# Patient Record
Sex: FEMALE | Race: ASIAN | NOT HISPANIC OR LATINO
[De-identification: names, ages, dates, MRNs, and addresses within clinical notes are randomized per-mention and may not be internally consistent; named-entity substitution may affect disease eponyms.]

---

## 2018-02-22 ENCOUNTER — RECORDS - HEALTHEAST (OUTPATIENT)
Dept: ADMINISTRATIVE | Facility: OTHER | Age: 83
End: 2018-02-22

## 2018-02-22 ENCOUNTER — AMBULATORY - HEALTHEAST (OUTPATIENT)
Dept: CARDIOLOGY | Facility: CLINIC | Age: 83
End: 2018-02-22

## 2018-02-28 ENCOUNTER — OFFICE VISIT - HEALTHEAST (OUTPATIENT)
Dept: CARDIOLOGY | Facility: CLINIC | Age: 83
End: 2018-02-28

## 2018-02-28 DIAGNOSIS — R00.2 PALPITATION: ICD-10-CM

## 2018-02-28 DIAGNOSIS — E78.2 MIXED HYPERLIPIDEMIA: ICD-10-CM

## 2018-02-28 DIAGNOSIS — R06.09 DYSPNEA ON EXERTION: ICD-10-CM

## 2018-02-28 LAB
ATRIAL RATE - MUSE: 73 BPM
DIASTOLIC BLOOD PRESSURE - MUSE: NORMAL MMHG
INTERPRETATION ECG - MUSE: NORMAL
P AXIS - MUSE: 43 DEGREES
PR INTERVAL - MUSE: 182 MS
QRS DURATION - MUSE: 86 MS
QT - MUSE: 410 MS
QTC - MUSE: 451 MS
R AXIS - MUSE: 9 DEGREES
SYSTOLIC BLOOD PRESSURE - MUSE: NORMAL MMHG
T AXIS - MUSE: 35 DEGREES
VENTRICULAR RATE- MUSE: 73 BPM

## 2018-02-28 RX ORDER — NITROGLYCERIN 0.4 MG/1
0.4 TABLET SUBLINGUAL EVERY 5 MIN PRN
Status: SHIPPED | COMMUNITY
Start: 2018-02-28

## 2018-02-28 RX ORDER — CLOPIDOGREL BISULFATE 75 MG/1
75 TABLET ORAL DAILY
Status: SHIPPED | COMMUNITY
Start: 2018-02-28

## 2018-02-28 RX ORDER — ATORVASTATIN CALCIUM 20 MG/1
20 TABLET, FILM COATED ORAL AT BEDTIME
Status: SHIPPED | COMMUNITY
Start: 2018-02-28

## 2018-02-28 RX ORDER — COLCHICINE 0.6 MG/1
0.6 TABLET ORAL DAILY
Status: SHIPPED | COMMUNITY
Start: 2018-02-28

## 2018-02-28 RX ORDER — FUROSEMIDE 40 MG
40 TABLET ORAL DAILY
Status: SHIPPED | COMMUNITY
Start: 2018-02-28

## 2018-02-28 RX ORDER — ALLOPURINOL 300 MG/1
300 TABLET ORAL DAILY
Status: SHIPPED | COMMUNITY
Start: 2018-02-28

## 2018-02-28 RX ORDER — METOPROLOL SUCCINATE 50 MG/1
50 TABLET, EXTENDED RELEASE ORAL DAILY
Qty: 90 TABLET | Refills: 3 | Status: SHIPPED | OUTPATIENT
Start: 2018-02-28

## 2018-02-28 ASSESSMENT — MIFFLIN-ST. JEOR: SCORE: 936.2

## 2018-03-09 ENCOUNTER — HOSPITAL ENCOUNTER (OUTPATIENT)
Dept: NUCLEAR MEDICINE | Facility: HOSPITAL | Age: 83
Discharge: HOME | End: 2018-03-09
Attending: INTERNAL MEDICINE

## 2018-03-09 ENCOUNTER — HOSPITAL ENCOUNTER (OUTPATIENT)
Dept: CARDIOLOGY | Facility: HOSPITAL | Age: 83
Discharge: HOME | End: 2018-03-09
Attending: INTERNAL MEDICINE

## 2018-03-09 DIAGNOSIS — R06.09 DYSPNEA ON EXERTION: ICD-10-CM

## 2018-03-09 DIAGNOSIS — R06.09 OTHER FORMS OF DYSPNEA: ICD-10-CM

## 2018-03-09 LAB
AORTIC ROOT: 2.9 CM
AORTIC VALVE MEAN VELOCITY: 71.4 CM/S
AR DECEL SLOPE: 3360 MM/S2
AR PEAK VELOCITY: 431 CM/S
AV DIMENSIONLESS INDEX VTI: 0.8
AV MEAN GRADIENT: 2 MMHG
AV PEAK GRADIENT: 4.3 MMHG
AV REGURGITANT PEAK GRADIENT: 74.3 MMHG
AV REGURGITATION PRESSURE HALF TIME: 375 MS
AV VALVE AREA: 2.2 CM2
BSA FOR ECHO PROCEDURE: 1.58 M2
CV BLOOD PRESSURE: NORMAL MMHG
CV ECHO HEIGHT: 58 IN
CV ECHO WEIGHT: 134 LBS
CV STRESS CURRENT BP HE: NORMAL
CV STRESS CURRENT HR HE: 100
CV STRESS CURRENT HR HE: 101
CV STRESS CURRENT HR HE: 83
CV STRESS CURRENT HR HE: 86
CV STRESS CURRENT HR HE: 95
CV STRESS CURRENT HR HE: 96
CV STRESS CURRENT HR HE: 97
CV STRESS CURRENT HR HE: 97
CV STRESS CURRENT HR HE: 98
CV STRESS DEVIATION TIME HE: NORMAL
CV STRESS ECHO PERCENT HR HE: NORMAL
CV STRESS EXERCISE STAGE HE: NORMAL
CV STRESS FINAL RESTING BP HE: NORMAL
CV STRESS FINAL RESTING HR HE: 95
CV STRESS MAX HR HE: 102
CV STRESS MAX TREADMILL GRADE HE: 0
CV STRESS MAX TREADMILL SPEED HE: 0
CV STRESS PEAK DIA BP HE: NORMAL
CV STRESS PEAK SYS BP HE: NORMAL
CV STRESS PHASE HE: NORMAL
CV STRESS PROTOCOL HE: NORMAL
CV STRESS RESTING PT POSITION HE: NORMAL
CV STRESS ST DEVIATION AMOUNT HE: NORMAL
CV STRESS ST DEVIATION ELEVATION HE: NORMAL
CV STRESS ST EVELATION AMOUNT HE: NORMAL
CV STRESS TEST TYPE HE: NORMAL
CV STRESS TOTAL STAGE TIME MIN 1 HE: NORMAL
DOP CALC AO PEAK VEL: 104 CM/S
DOP CALC AO VTI: 20.3 CM
DOP CALC LVOT AREA: 2.83 CM2
DOP CALC LVOT DIAMETER: 1.9 CM
DOP CALC LVOT STROKE VOLUME: 45.6 CM3
DOP CALCLVOT PEAK VEL VTI: 16.1 CM
ECHO EJECTION FRACTION ESTIMATED: 50 %
EJECTION FRACTION: 57 % (ref 55–75)
FRACTIONAL SHORTENING: 31.3 % (ref 28–44)
INTERVENTRICULAR SEPTUM IN END DIASTOLE: 0.9 CM (ref 0.6–0.9)
IVS/PW RATIO: 1
LA AREA 1: 27.1 CM2
LA AREA 2: 28.1 CM2
LEFT ATRIUM LENGTH: 6.3 CM
LEFT ATRIUM SIZE: 4.8 CM
LEFT ATRIUM TO AORTIC ROOT RATIO: 1.66 NO UNITS
LEFT ATRIUM VOLUME INDEX: 65 ML/M2
LEFT ATRIUM VOLUME: 102.7 ML
LEFT VENTRICLE CARDIAC INDEX: 2.1 L/MIN/M2
LEFT VENTRICLE CARDIAC OUTPUT: 3.3 L/MIN
LEFT VENTRICLE DIASTOLIC VOLUME INDEX: 117.1 CM3/M2 (ref 34–74)
LEFT VENTRICLE DIASTOLIC VOLUME: 185 CM3 (ref 46–106)
LEFT VENTRICLE HEART RATE: 73 BPM
LEFT VENTRICLE MASS INDEX: 152.7 G/M2
LEFT VENTRICLE SYSTOLIC VOLUME INDEX: 50.6 CM3/M2 (ref 11–31)
LEFT VENTRICLE SYSTOLIC VOLUME: 80 CM3 (ref 14–42)
LEFT VENTRICULAR INTERNAL DIMENSION IN DIASTOLE: 6.4 CM (ref 3.8–5.2)
LEFT VENTRICULAR INTERNAL DIMENSION IN SYSTOLE: 4.4 CM (ref 2.2–3.5)
LEFT VENTRICULAR MASS: 241.2 G
LEFT VENTRICULAR OUTFLOW TRACT MEAN GRADIENT: 1 MMHG
LEFT VENTRICULAR OUTFLOW TRACT MEAN VELOCITY: 52.6 CM/S
LEFT VENTRICULAR POSTERIOR WALL IN END DIASTOLE: 0.9 CM (ref 0.6–0.9)
LV STROKE VOLUME INDEX: 28.9 ML/M2
MITRAL REGURGITANT VELOCITY TIME INTEGRAL: 184 CM
MITRAL VALVE E/A RATIO: 1.2
MR FLOW: 99 CM3
MR MEAN GRADIENT: 93 MMHG
MR MEAN VELOCITY: 457 CM/S
MR PEAK GRADIENT: 134.1 MMHG
MR PISA EROA: 0.5 CM2
MR PISA RADIUS: 1.4 CM
MR PISA VN NYQUIST: 25.3 CM/S
MV AVERAGE E/E' RATIO: 29.5 CM/S
MV DECELERATION TIME: 137 MS
MV E'TISSUE VEL-LAT: 7.31 CM/S
MV E'TISSUE VEL-MED: 4.09 CM/S
MV LATERAL E/E' RATIO: 23
MV MEDIAL E/E' RATIO: 41.1
MV PEAK A VELOCITY: 135 CM/S
MV PEAK E VELOCITY: 168 CM/S
MV REGURGITANT VOLUME: 99 CC
NUC REST DIASTOLIC VOLUME INDEX: 2144 LBS
NUC REST SYSTOLIC VOLUME INDEX: 58 IN
NUC STRESS EJECTION FRACTION: 31 %
PISA MR PEAK VEL: 579 CM/S
STRESS ECHO BASELINE BP: NORMAL
STRESS ECHO BASELINE HR: 82
STRESS ECHO CALCULATED PERCENT HR: 75 %
STRESS ECHO LAST STRESS BP: NORMAL
STRESS ECHO LAST STRESS HR: 101
TRICUSPID REGURGITATION PEAK PRESSURE GRADIENT: 66.9 MMHG
TRICUSPID VALVE ANULAR PLANE SYSTOLIC EXCURSION: 1.7 CM
TRICUSPID VALVE PEAK REGURGITANT VELOCITY: 409 CM/S

## 2018-03-09 ASSESSMENT — MIFFLIN-ST. JEOR: SCORE: 932.57

## 2018-03-15 ENCOUNTER — COMMUNICATION - HEALTHEAST (OUTPATIENT)
Dept: CARDIOLOGY | Facility: CLINIC | Age: 83
End: 2018-03-15

## 2018-04-30 ENCOUNTER — RECORDS - HEALTHEAST (OUTPATIENT)
Dept: ADMINISTRATIVE | Facility: OTHER | Age: 83
End: 2018-04-30

## 2018-04-30 ENCOUNTER — AMBULATORY - HEALTHEAST (OUTPATIENT)
Dept: CARDIOLOGY | Facility: CLINIC | Age: 83
End: 2018-04-30

## 2018-05-03 ENCOUNTER — OFFICE VISIT - HEALTHEAST (OUTPATIENT)
Dept: CARDIOLOGY | Facility: CLINIC | Age: 83
End: 2018-05-03

## 2018-05-03 DIAGNOSIS — I34.0 SEVERE MITRAL INSUFFICIENCY: ICD-10-CM

## 2018-05-03 DIAGNOSIS — I25.5 ISCHEMIC CARDIOMYOPATHY: ICD-10-CM

## 2018-05-03 ASSESSMENT — MIFFLIN-ST. JEOR: SCORE: 901.95

## 2019-03-19 ENCOUNTER — COMMUNICATION - HEALTHEAST (OUTPATIENT)
Dept: ADMINISTRATIVE | Facility: CLINIC | Age: 84
End: 2019-03-19

## 2021-06-01 VITALS — BODY MASS INDEX: 27.08 KG/M2 | HEIGHT: 58 IN | WEIGHT: 129 LBS

## 2021-06-01 VITALS — WEIGHT: 134.8 LBS | BODY MASS INDEX: 28.29 KG/M2 | HEIGHT: 58 IN

## 2021-06-01 VITALS — WEIGHT: 134 LBS | HEIGHT: 58 IN | BODY MASS INDEX: 28.13 KG/M2

## 2021-06-16 PROBLEM — I25.5 ISCHEMIC CARDIOMYOPATHY: Status: ACTIVE | Noted: 2018-05-03

## 2021-06-16 PROBLEM — R00.2 PALPITATION: Status: ACTIVE | Noted: 2018-02-28

## 2021-06-16 PROBLEM — R06.09 DYSPNEA ON EXERTION: Status: ACTIVE | Noted: 2018-02-28

## 2021-06-16 PROBLEM — E78.2 MIXED HYPERLIPIDEMIA: Status: ACTIVE | Noted: 2018-02-28

## 2021-06-16 PROBLEM — I34.0 SEVERE MITRAL INSUFFICIENCY: Status: ACTIVE | Noted: 2018-05-03

## 2021-06-16 NOTE — PROGRESS NOTES
"CARDIOLOGY CLINIC CONSULT NOTE     Assessment/Plan:   1.  Exertional dyspnea, orthopnea, and murmur consistent with mitral insufficiency.  This suggests that significant mitral insufficiency may be the cause of her 5-6 year history of exertional dyspnea and orthopnea.  The fact that surgery as previously been recommended and declined suggests that the degree is severe, yet undocumented.  The pathophysiology of severe mitral insufficiency was discussed with the patient, her daughter, and granddaughter.  We discussed that medications are likely to be insufficient for severe degree of mitral insufficiency and that some type of intervention may be appropriate.  We did discuss both surgical treatment and the possibility of a percutaneous treatment of her valvular disease, but that further extensive assessment is required first.  If the echocardiogram shows severe mitral insufficiency, then likely transesophageal echocardiographic assessment is indicated to see if she is a candidate for the \"mitral clip\" procedure.  2.  Hyperlipidemia.  On treatment  3.  Rule out coronary artery disease.  I believe that pharmacologic nuclear stress testing is reasonable to exclude coronary disease as contributing to her dyspnea.    Further recommendations will follow pending results of testing.     History of Present Illness:     It is my pleasure to see Dora Kan at the [facility] for evaluation of exertional dyspnea and palpitations.  Patient is accompanied by her daughter, and a granddaughter who interprets.    Dora Kan is a 84 y.o. female with a past medical history of hyperlipidemia, gout, and valvular heart disease.  She has had a previous evaluation of heart disease in [place] in 2017.  Unfortunately no details of this are available.  She is also noted in the chart to have a history of \"congestive heart failure\", as well as\" coronary atherosclerosis\".  They are not aware of the hospital in [place] where " "their evaluation is done previously.  They report that on 2 occasions she had an evaluation performed and surgery to repair a \"hole in the heart\" was recommended, but declined by the patient.  She does report compliance with her current medication regimen.    Over the last 5-6 years.  She has had gradually progressive shortness of breath with activities along with orthopnea.  She has had nocturnal coughing but no snoring is been appreciated.  She has had no fevers chills or night sweats.  She is not aware of any racing of the heartbeat.  She notes that with walking to the bathroom or climbing steps she becomes more short of breath.  She now sleeps on extra pillows.  She has not had lower extremity edema.  She has had no syncope or falls.  She denies chest pain.    Past Medical History:     Patient Active Problem List   Diagnosis     Dyspnea on exertion     Palpitation       Past Surgical History:   History reviewed. No pertinent surgical history.    Family History:     Family History   Problem Relation Age of Onset     Acute Myocardial Infarction Neg Hx      Family history reviewed and is not pertinent to the chief complaint or presenting problem  8 of her 13 children survive, 7 daughters and 1 son    Social History:    reports that she has never smoked. She has never used smokeless tobacco. She reports that she does not use illicit drugs.    Exercise: Walks with a walker    Sleep: Restorative, sleeping with the head of the bed elevated.  No snoring    Meds:     Current Outpatient Prescriptions   Medication Sig Dispense Refill     allopurinol (ZYLOPRIM) 300 MG tablet Take 300 mg by mouth daily.       aspirin 81 MG EC tablet Take 81 mg by mouth daily.       atorvastatin (LIPITOR) 20 MG tablet Take 20 mg by mouth at bedtime.       clopidogrel (PLAVIX) 75 mg tablet Take 75 mg by mouth daily.       colchicine 0.6 mg tablet Take 0.6 mg by mouth daily.       furosemide (LASIX) 40 MG tablet Take 40 mg by mouth daily.       " "metoprolol tartrate (LOPRESSOR) 25 MG tablet Take 25 mg by mouth daily.       nitroglycerin (NITROSTAT) 0.4 MG SL tablet Place 0.4 mg under the tongue every 5 (five) minutes as needed for chest pain.       No current facility-administered medications for this visit.        Allergies:   Review of patient's allergies indicates no known allergies.    Review of Systems:     General: Night Sweats  Eyes: WNL  Ears/Nose/Throat: WNL  Lungs: Cough, Shortness of Breath  Heart: Shortness of Breath with activity, Leg Swelling (Palpitations)  Stomach: WNL  Bladder: WNL  Muscle/Joints: Joint Pain  Skin: WNL  Nervous System: Dizziness  Mental Health: Depression, Anxiety     Blood: WNL        Objective:      Physical Exam  134 lb 12.8 oz (61.1 kg)  4' 10\" (1.473 m)  Body mass index is 28.17 kg/(m^2).  /70 (Patient Site: Right Arm, Patient Position: Sitting, Cuff Size: Adult Regular)  Pulse 76  Resp 20  Ht 4' 10\" (1.473 m)  Wt 134 lb 12.8 oz (61.1 kg)  BMI 28.17 kg/m2      General Appearance : Awake, Alert, No acute distress  HEENT: No Scleral icterus; the mucous membranes were pink and moist.  Conjunctivae not injected  Neck:  No cervical bruits, jugular venous distention, or thyromegaly   Chest: The spine was straight  Lungs: Respirations unlabored; bibasilar crackles are audible with clear apices  Cardiovascular: Normal point of maximal impulse.  Auscultation reveals normal first and second heart sounds with 3/6 blowing systolic murmur at the cardiac apex radiating to the axilla. carotid, radial, and dorsalis pedal pulses and intact.  Abdomen: No organomegaly, masses, bruits, or tenderness. Bowels sounds are present  Extremities: Trace to 1+ bilateral pretibial and ankle edema  Skin: No xanthelasma. Warm, Dry.  Musculoskeletal: No tenderness.  Neurologic:  No tenderness.      EKG:  Normal sinus rhythm at 73 bpm.  Normal ECG.      Lab Review   No results found for: NA, K, CL, CO2, BUN, CREATININE, GLUCOSE, CALCIUM  No " results found for: WBC, HGB, HCT, MCV, PLT  No results found for: CHOL, TRIG, HDL, LDLCALC  No results found for: TROPONINI  No results found for: BNP  No results found for: TSH    Enrico Alexis MD [facility]    His note created using Dragon voice recognition software. Sound alike errors may have escaped editing.

## 2021-06-17 NOTE — PROGRESS NOTES
[facility] Office Note    Assessment / Plan:    1.  Ischemic cardia myopathy.  Stable on her present medical regimen.  Her blood pressure is marginal today, if stable she may be a candidate for addition of ACE inhibitor, ARB, or even Entresto.  Plan follow-up in 6 months time.  2.  Severe mitral insufficiency, 6 years in duration.  We discussed her increased risk for atrial fibrillation and present probably for the development of palpitations or racing heartbeat.  Not interested in any repair.      Plan follow-up in 6 months    ______________________________________________________________________    Subjective:    I had the opportunity to see Dora Kan at the [facility]. Dora Kan is a 84 y.o. female with a history of  hyperlipidemia, gout, and valvular heart disease.  She has a history of coronary atherosclerosis and mitral valve disease.  As far back as 2012, she was apparently recommended to undergo bypass surgery and valve replacement surgery but declined intervention at that time.  I met her in February and initiated some testing to assess function.  Nuclear stress test showed minimal inducible ischemia with  areas of apical and lateral infarction.  The study calculated ejection fraction is 31%.  An echocardiogram also in March showed an estimated ejection fraction of 50%.  Severe pulmonary hypertension along with severe mitral insufficiency with an eccentric jet was noted.    Since I saw her she has been hospitalized twice for exacerbations of heart failure.  She is responded to medications including IV diuretics on both occasions.  An echocardiogram at [facility] showed an ejection fraction 40-45%, severe mitral insufficiency, but only a mild degree of pulmonary hypertension.    She has had no chest pains.  She feels that her breathing is mostly stable.  She occasionally awakens short of breath and has to sit up at the side of the bed or in her chair for a couple of  hours.  She is not aware of any racing heartbeat or palpitations.  She has had no syncope or falls.  She does sleep with the head of bed elevated at baseline.  She notes intermittent lower extremity edema but tries to keep her feet elevated.  She avoids sodium in her diet whenever she can.    We reviewed discussed the results of testing which show severe eccentric mitral insufficiency.  She is not interested in either surgical treatment of this, or possibility of a mitral clip.  She is grateful that the medications have allowed her to feel well but reaffirms that she wants no resuscitation in the event of cardiac pulmonary arrest.    ______________________________________________________________________    Problem List:  Patient Active Problem List   Diagnosis     Dyspnea on exertion     Palpitation     Mixed hyperlipidemia     Medical History:  Past Medical History:   Diagnosis Date     Hyperlipidemia      Valvular disease      Surgical History:  History reviewed. No pertinent surgical history.  Social History:  Social History     Social History     Marital status:      Spouse name: N/A     Number of children: N/A     Years of education: N/A     Occupational History     Not on file.     Social History Main Topics     Smoking status: Never Smoker     Smokeless tobacco: Never Used     Alcohol use Not on file     Drug use: No     Sexual activity: Not on file     Other Topics Concern     Not on file     Social History Narrative     Sleep History:  Sleeps on multiple pillows with the head of the bed elevated, occasionally awakens shortness of breath and sits up in a chair for couple hours      Review of Systems:   General: WNL  Eyes: WNL  Ears/Nose/Throat: WNL  Lungs: Cough  Heart: Leg Swelling  Stomach: WNL  Bladder: WNL  Muscle/Joints: Joint Pain  Skin: WNL  Nervous System: WNL  Mental Health: Depression     Blood: WNL          Family History:  Family History   Problem Relation Age of Onset     Acute Myocardial  "Infarction Neg Hx          Allergies:  No Known Allergies  Medications:  Current Outpatient Prescriptions   Medication Sig Dispense Refill     allopurinol (ZYLOPRIM) 300 MG tablet Take 300 mg by mouth daily.       aspirin 81 MG EC tablet Take 81 mg by mouth daily.       atorvastatin (LIPITOR) 20 MG tablet Take 20 mg by mouth at bedtime.       clopidogrel (PLAVIX) 75 mg tablet Take 75 mg by mouth daily.       colchicine 0.6 mg tablet Take 0.6 mg by mouth daily.       furosemide (LASIX) 40 MG tablet Take 40 mg by mouth daily.       metoprolol succinate (TOPROL-XL) 50 MG 24 hr tablet Take 1 tablet (50 mg total) by mouth daily. 90 tablet 3     nitroglycerin (NITROSTAT) 0.4 MG SL tablet Place 0.4 mg under the tongue every 5 (five) minutes as needed for chest pain.       No current facility-administered medications for this visit.        Objective:   Wt Readings from Last 3 Encounters:   05/03/18 129 lb (58.5 kg)   03/09/18 134 lb (60.8 kg)   02/28/18 134 lb 12.8 oz (61.1 kg)     Vital signs:  /58 (Patient Site: Right Arm, Patient Position: Sitting, Cuff Size: Adult Regular)  Pulse 68  Resp 16  Ht 4' 9.5\" (1.461 m)  Wt 129 lb (58.5 kg)  BMI 27.43 kg/m2      Physical Exam:    GENERAL APPEARANCE: Alert, cooperative and in no acute distress.  HEENT: Conjunctivae not injected.  No scleral icterus. No Xanthelasma. Oral mucous membranes pink and moist.  NECK: No JVD.  No Hepatojugular reflux. Thyroid not enlarged.  CHEST: clear to auscultation  CARDIOVASCULAR: Regular S1, S2 with 3/6 blowing systolic murmur left lower sternal border into the axilla.  No clicks or rubs. Brachial, radial and posterior tibial pulses are intact and symmetric. No carotid bruits noted.    ABDOMEN: Nontender. BS+. No bruits.  EXTREMITIES: No cyanosis, clubbing or edema.  SKIN:  No rash, bruising    Lab Results:  LIPIDS:  No results found for: CHOL  No results found for: HDL  No results found for: LDLCALC  No results found for: " TRIG    Pharmacologic nuclear stress test  3/2018:Conclusion     1.The pharmacologic nuclear stress test is abnormal.    2.There is a small area of ischemia in the apical inferolateral segment(s) of the left ventricle which is [place] infarct to a medium sized area of transmural infarction in the apical to basal lateral and anterolateral segment(s) of the left ventricle.    3.The left ventricular ejection fraction is 31% with more pronounced lateral hypokinesis.    4.The patient is at a low risk of future cardiac ischemic events.    5.There is no prior study available.     Echocardiogram 3/2018:  Summary     Left ventricle ejection fraction is mildly decreased. The estimated left ventricular ejection fraction is 50%.    The following segments are akinetic: apex. The following segments are hypokinetic: mid inferolateral and apical lateral. All other segments are normal.    Left Atrium: Left atrial volume is severely increased.    Mitral Valve: There is mild mitral annular calcification. Severe mitral regurgitation. The jet is posterior directed and is eccentric.    Moderate pulmonary hypertension present. The estimated systolic pulmonary artery pressure is 75 mmhg       Echocardiogram 4/2018 at [facility]:  Final Conclusion    Mild-moderate left ventricular enlargement. Mild left ventricular hypertrophy.    Inferior and inferolateral akinesis with anterolateral hyokinesis.    At least moderately abnormal left ventricular systolic function considering the severity of MR.    The ejection fraction is visually estimated at 40-45%.    The right ventricle is normal in size and function.    Severe left atrial enlargement.    Very minimal thickening of the anterior mitral leaflet. [place], restricted motion of the     posterior leaflet with overriding of anterior mitral    leaflet duing systole with posteriorly directed wall hugging eccentric mitral regurgitation     jet. Severe mitral regurgitation. No mitral    stenosis.     Estimated pulmonary artery pressure of  at least 44 mmHg + RA pressure.    Mild aortic regurgitation.          ADITI MCALLISTER MD [facility]  [phone]    This note created using Dragon voice recognition software.  Sound alike errors may have escaped editing.

## 2021-06-19 NOTE — LETTER
Letter by Enrico Alexis MD at      Author: Enrico Alexis MD Service: -- Author Type: --    Filed:  Encounter Date: 3/19/2019 Status: (Other)         Dora Kan  [address] [address]      March 19, 2019      Dear Dora,    This letter is to remind you that you will be due for your follow up appointment with Dr. Enrico Alexis. To help ensure you are in the best health possible, a regular follow-up with your cardiologist is essential.     Please call our Patient Scheduling Line at [phone] to schedule your appointment at your earliest convenience.  If you have recently scheduled an appointment, please disregard this letter.    We look forward to seeing you again. As always, we are available at the number  above for any questions or concerns you may have.      Sincerely,     The Physicians and Staff of [facility] [place]